# Patient Record
Sex: MALE | Race: WHITE | NOT HISPANIC OR LATINO | Employment: OTHER | URBAN - METROPOLITAN AREA
[De-identification: names, ages, dates, MRNs, and addresses within clinical notes are randomized per-mention and may not be internally consistent; named-entity substitution may affect disease eponyms.]

---

## 2023-10-25 DIAGNOSIS — R35.1 NOCTURIA: Primary | ICD-10-CM

## 2023-11-21 PROBLEM — I77.819 AORTIC ECTASIA, UNSPECIFIED SITE (CMS-HCC): Status: ACTIVE | Noted: 2022-09-22

## 2023-11-21 PROBLEM — R29.898 SEVERE MUSCLE DECONDITIONING: Status: ACTIVE | Noted: 2021-07-24

## 2023-11-21 PROBLEM — I83.819 VARICOSE VEINS WITH PAIN: Status: ACTIVE | Noted: 2017-08-09

## 2023-11-21 PROBLEM — R35.1 NOCTURIA: Status: ACTIVE | Noted: 2023-11-21

## 2023-11-21 PROBLEM — R97.20 ELEVATED PSA: Status: ACTIVE | Noted: 2023-11-21

## 2023-11-21 PROBLEM — M48.062 SPINAL STENOSIS OF LUMBAR REGION WITH NEUROGENIC CLAUDICATION: Status: ACTIVE | Noted: 2022-07-28

## 2023-11-21 PROBLEM — S76.119A RUPTURE OF QUADRICEPS TENDON: Status: ACTIVE | Noted: 2021-07-20

## 2023-11-21 PROBLEM — M75.42 IMPINGEMENT SYNDROME OF LEFT SHOULDER: Status: ACTIVE | Noted: 2019-12-16

## 2023-11-21 PROBLEM — I83.899 VARICOSE VEINS OF LEG WITH SWELLING: Status: ACTIVE | Noted: 2017-08-09

## 2023-11-21 PROBLEM — M25.512 ACUTE PAIN OF LEFT SHOULDER: Status: ACTIVE | Noted: 2019-11-04

## 2023-11-21 PROBLEM — I70.213 ATHEROSCLER OF NATIVE ARTERY OF BOTH LEGS WITH INTERMIT CLAUDICATION (CMS-HCC): Status: ACTIVE | Noted: 2022-09-22

## 2023-11-21 PROBLEM — M17.0 PRIMARY OSTEOARTHRITIS OF BOTH KNEES: Status: ACTIVE | Noted: 2022-03-23

## 2023-11-21 PROBLEM — R29.898 WEAKNESS OF BOTH LOWER LIMBS: Status: ACTIVE | Noted: 2023-11-02

## 2023-11-21 PROBLEM — S76.119D: Status: ACTIVE | Noted: 2021-09-03

## 2023-11-21 PROBLEM — L24.7 CONTACT DERMATITIS AND ECZEMA DUE TO PLANT: Status: ACTIVE | Noted: 2019-07-30

## 2023-11-21 PROBLEM — C61 PROSTATE CANCER (MULTI): Status: ACTIVE | Noted: 2023-11-21

## 2023-11-21 ASSESSMENT — ENCOUNTER SYMPTOMS
CHILLS: 0
ENDOCRINE NEGATIVE: 1
NAUSEA: 0
ALLERGIC/IMMUNOLOGIC NEGATIVE: 1
FEVER: 0
COUGH: 0
PSYCHIATRIC NEGATIVE: 1
DIFFICULTY URINATING: 0
EYES NEGATIVE: 1
SHORTNESS OF BREATH: 0

## 2023-11-21 NOTE — PROGRESS NOTES
Subjective   Patient ID: Selvin Guzman is a 78 y.o. male.    HPI   Hx of prostate cancer. Dx 1/2019. Path report showed prostate cancer on left side. [place] 3+4=7 involving multiple cores and 20% of tissue. Most recent PSA was 7.71 on 11/23. Prior PSA was  6.59 on 10/22. Prior PSA was 6.21 on 4/22. . Prior PSA was done 4/21 and was 5.31.... Previous PSA was done 12/2020 and was a 4.93, . Patient has never had an MRI due to Claustrophobia. LUT'S sx are mild and stable....Denies urgency and frequency....Denies dysuria....Denies hematuria...Nocturia x1 ..No medication for LUT'S......ED is chronic..No medication for this...Pt. has failed oral medication.       Review of Systems   Constitutional:  Negative for chills and fever.   HENT: Negative.     Eyes: Negative.    Respiratory:  Negative for cough and shortness of breath.    Cardiovascular:  Negative for chest pain and leg swelling.   Gastrointestinal:  Negative for nausea.   Endocrine: Negative.    Genitourinary:  Negative for difficulty urinating.        Negative except for documented in HPI   Allergic/Immunologic: Negative.    Neurological:         Alert & oriented X 3   Hematological:         Denies blood thinners   Psychiatric/Behavioral: Negative.         Objective   Physical Exam  Vitals and nursing note reviewed.   Constitutional:       General: He is not in acute distress.     Appearance: Normal appearance.   Pulmonary:      Effort: Pulmonary effort is normal.   Abdominal:      Tenderness: There is no abdominal tenderness.   Genitourinary:     Comments: Kidneys non palpable bilaterally  Bladder non palpable or tender  Scrotum no mass, No hydrocele  Epididymis- No spermatocele. Non Tender.  Testicles: No mass  Urethra: No discharge  Penis within normal limits... No lesions. circumcised  Prostate - symmetric, no nodules. BENIGN  Seminal Vesicals: No mass.  Sphincter tone: normal  Neurological:      Mental Status: He is alert.         Assessment/Plan    Diagnoses and all orders for this visit:  Elevated PSA  Prostate cancer (CMS/HCC)  Nocturia  Erectile dysfunction, unspecified erectile dysfunction type      All available PSA values reviewed, Options discussed. Questions answered.  Past Bx reviewed  Pros and cons of prostate biopsy reviewed. Other options discussed. Questions answered  Cipro Rx given  Patient prefers repeat Bx and Declines MRI   Diet changes for prostate health discussed and educational information given. Pros/Cons of prostate health supplements discussed.   Treatment options for LUTS reviewed  Discussed timed voiding. Discussed fluid and caffeine intake  Treatment options for ED reviewed.  Discussed TriMix-Rx given  Lifestyle change to help prevent UTIs discussed. Encouraged fluid intake.    F/U with TriMix teaching    Patient want repeat Prostate Bx after returning from [place]

## 2023-11-27 ENCOUNTER — OFFICE VISIT (OUTPATIENT)
Dept: UROLOGY | Facility: CLINIC | Age: 78
End: 2023-11-27
Payer: MEDICARE

## 2023-11-27 VITALS — WEIGHT: 203 LBS | BODY MASS INDEX: 28.31 KG/M2 | RESPIRATION RATE: 16 BRPM

## 2023-11-27 DIAGNOSIS — C61 PROSTATE CANCER (MULTI): ICD-10-CM

## 2023-11-27 DIAGNOSIS — N52.9 ERECTILE DYSFUNCTION, UNSPECIFIED ERECTILE DYSFUNCTION TYPE: ICD-10-CM

## 2023-11-27 DIAGNOSIS — R97.20 ELEVATED PSA: Primary | ICD-10-CM

## 2023-11-27 DIAGNOSIS — R35.1 NOCTURIA: ICD-10-CM

## 2023-11-27 PROCEDURE — 1159F MED LIST DOCD IN RCRD: CPT | Performed by: UROLOGY

## 2023-11-27 PROCEDURE — 99214 OFFICE O/P EST MOD 30 MIN: CPT | Performed by: UROLOGY

## 2023-11-27 RX ORDER — LIDOCAINE 50 MG/G
1 PATCH TOPICAL EVERY 24 HOURS
COMMUNITY
Start: 2023-08-15

## 2023-11-27 RX ORDER — TRIAMTERENE AND HYDROCHLOROTHIAZIDE 37.5; 25 MG/1; MG/1
CAPSULE ORAL
COMMUNITY
Start: 2013-08-01

## 2023-11-27 RX ORDER — MAGNESIUM 200 MG
200 TABLET ORAL
COMMUNITY

## 2023-11-27 RX ORDER — CIPROFLOXACIN 500 MG/1
500 TABLET ORAL 2 TIMES DAILY
Qty: 6 TABLET | Refills: 0 | Status: SHIPPED | OUTPATIENT
Start: 2023-11-27 | End: 2023-11-30

## 2023-11-27 RX ORDER — SIMVASTATIN 40 MG/1
1 TABLET, FILM COATED ORAL NIGHTLY
COMMUNITY
Start: 2023-08-25

## 2023-11-27 RX ORDER — CELECOXIB 200 MG/1
200 CAPSULE ORAL 2 TIMES DAILY
COMMUNITY

## 2023-11-27 RX ORDER — ASPIRIN 81 MG/1
81 TABLET ORAL
COMMUNITY
Start: 2022-09-22

## 2023-11-30 DIAGNOSIS — N52.9 ERECTILE DYSFUNCTION, UNSPECIFIED ERECTILE DYSFUNCTION TYPE: ICD-10-CM

## 2023-12-01 RX ORDER — SILDENAFIL 100 MG/1
100 TABLET, FILM COATED ORAL DAILY PRN
Qty: 30 TABLET | Refills: 6 | Status: SHIPPED | OUTPATIENT
Start: 2023-12-01 | End: 2023-12-31

## 2023-12-07 ASSESSMENT — ENCOUNTER SYMPTOMS
FEVER: 0
CHILLS: 0
COUGH: 0
ALLERGIC/IMMUNOLOGIC NEGATIVE: 1
DIFFICULTY URINATING: 0
EYES NEGATIVE: 1
PSYCHIATRIC NEGATIVE: 1
ENDOCRINE NEGATIVE: 1
SHORTNESS OF BREATH: 0
NAUSEA: 0

## 2023-12-07 NOTE — PROGRESS NOTES
Subjective   Patient ID: Selvin Guzman is a 78 y.o. male.    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 He has failed oral medications. Hx of prostate cancer. Dx 1/2019. Path report showed prostate cancer on left side. [place] 3+4=7 involving multiple cores and 20% of tissue. Most recent PSA was 7.71 on 11/23. Prior PSA was  6.59 on 10/22. Prior PSA was 6.21 on 4/22. . Prior PSA was done 4/21 and was 5.31.... Previous PSA was done 12/2020 and was a 4.93, . Patient has never had an MRI due to Claustrophobia. LUT'S sx are mild and stable....Denies urgency and frequency....Denies dysuria....Denies hematuria...Nocturia x1 ..No medication for LUT'S...       Review of Systems   Constitutional:  Negative for chills and fever.   HENT: Negative.     Eyes: Negative.    Respiratory:  Negative for cough and shortness of breath.    Cardiovascular:  Negative for chest pain and leg swelling.   Gastrointestinal:  Negative for nausea.   Endocrine: Negative.    Genitourinary:  Negative for difficulty urinating.        Negative except for documented in HPI   Allergic/Immunologic: Negative.    Neurological:         Alert & oriented X 3   Hematological:         Denies blood thinners   Psychiatric/Behavioral: Negative.         Objective   Physical Exam  Vitals and nursing note reviewed.   Constitutional:       General: He is not in acute distress.     Appearance: Normal appearance.   Pulmonary:      Effort: Pulmonary effort is normal.      Breath sounds: Normal breath sounds.   Abdominal:      Palpations: Abdomen is soft.      Tenderness: There is no abdominal tenderness.   Genitourinary:     Comments: Kidneys non palpable bilaterally  Bladder non palpable or tender  Scrotum no mass, No hydrocele  Epididymis- No spermatocele. Non Tender.  Testicles: No mass  Urethra: No discharge. circumcised  Penis within normal limits... No lesions  Prostate - deferred  Neurological:      Mental Status: He is alert.         Assessment/Plan        Diagnoses and all orders for this visit:  Prostate cancer (CMS/[facility])  Nocturia  Erectile dysfunction, unspecified erectile dysfunction type  Elevated PSA    All available PSA values reviewed, Options discussed. Questions answered.   Diet changes for prostate health discussed and educational information given. Pros/Cons of prostate health supplements discussed.   Treatment options for LUTS reviewed  Discussed timed voiding. Discussed fluid and caffeine intake  Treatment options for ED reviewed.  Lifestyle change to help prevent UTIs discussed. Encouraged fluid intake.    AFTER Visit I taught patient Trimix  0.1ml given    F/U virtual 6 weeks

## 2023-12-11 ENCOUNTER — OFFICE VISIT (OUTPATIENT)
Dept: UROLOGY | Facility: CLINIC | Age: 78
End: 2023-12-11
Payer: MEDICARE

## 2023-12-11 DIAGNOSIS — R35.1 NOCTURIA: ICD-10-CM

## 2023-12-11 DIAGNOSIS — N52.9 ERECTILE DYSFUNCTION, UNSPECIFIED ERECTILE DYSFUNCTION TYPE: ICD-10-CM

## 2023-12-11 DIAGNOSIS — R97.20 ELEVATED PSA: ICD-10-CM

## 2023-12-11 DIAGNOSIS — C61 PROSTATE CANCER (MULTI): ICD-10-CM

## 2023-12-11 PROCEDURE — 1159F MED LIST DOCD IN RCRD: CPT | Performed by: UROLOGY

## 2023-12-11 PROCEDURE — 99213 OFFICE O/P EST LOW 20 MIN: CPT | Performed by: UROLOGY

## 2023-12-11 PROCEDURE — 4004F PT TOBACCO SCREEN RCVD TLK: CPT | Performed by: UROLOGY

## 2024-01-26 ASSESSMENT — ENCOUNTER SYMPTOMS
ALLERGIC/IMMUNOLOGIC NEGATIVE: 1
COUGH: 0
FEVER: 0
PSYCHIATRIC NEGATIVE: 1
SHORTNESS OF BREATH: 0
ENDOCRINE NEGATIVE: 1
NAUSEA: 0
EYES NEGATIVE: 1
DIFFICULTY URINATING: 0
CHILLS: 0

## 2024-01-26 NOTE — PROGRESS NOTES
Subjective   Patient ID: Selvin Guzman is a 78 y.o. male.    HPI  Patient is here for 6 week follow up. Hx of ED. He was taught Trimix last visit and states it was helpful. He has failed oral medications. Hx of prostate cancer. Dx 1/2019. Path report showed prostate cancer on left side. [place] 3+4=7 involving multiple cores and 20% of tissue. Most recent PSA was 7.71 on 11/23. Prior PSA was  6.59 on 10/22. Prior PSA was 6.21 on 4/22. . Prior PSA was done 4/21 and was 5.31.... Previous PSA was done 12/2020 and was a 4.93, . Patient has never had an MRI due to Claustrophobia. LUT'S sx are mild and stable....Denies urgency and frequency....Denies dysuria....Denies hematuria...Nocturia x1 ..No medication for LUT'S...        Review of Systems   Constitutional:  Negative for chills and fever.   HENT: Negative.     Eyes: Negative.    Respiratory:  Negative for cough and shortness of breath.    Cardiovascular:  Negative for chest pain and leg swelling.   Gastrointestinal:  Negative for nausea.   Endocrine: Negative.    Genitourinary:  Negative for difficulty urinating.        Negative except for documented in HPI   Allergic/Immunologic: Negative.    Neurological:         Alert & oriented X 3   Hematological:         Denies blood thinners   Psychiatric/Behavioral: Negative.         Objective   Physical Exam  No PE done given the virtual nature of visit.   Assessment/Plan   Diagnoses and all orders for this visit:  Prostate cancer (CMS/[facility])  Nocturia  Erectile dysfunction, unspecified erectile dysfunction type  Elevated PSA    All available PSA values reviewed, Options discussed. Questions answered.   Diet changes for prostate health discussed and educational information given. Pros/Cons of prostate health supplements discussed.   Treatment options for LUTS reviewed  Discussed timed voiding. Discussed fluid and caffeine intake  Treatment options for ED reviewed.  Continue TriMix  Lifestyle change to help prevent UTIs  discussed. Encouraged fluid intake.    F/U with PSA 5/24

## 2024-01-29 ENCOUNTER — TELEMEDICINE (OUTPATIENT)
Dept: UROLOGY | Facility: CLINIC | Age: 79
End: 2024-01-29
Payer: MEDICARE

## 2024-01-29 DIAGNOSIS — R97.20 ELEVATED PSA: ICD-10-CM

## 2024-01-29 DIAGNOSIS — R35.1 NOCTURIA: ICD-10-CM

## 2024-01-29 DIAGNOSIS — N52.9 ERECTILE DYSFUNCTION, UNSPECIFIED ERECTILE DYSFUNCTION TYPE: ICD-10-CM

## 2024-01-29 DIAGNOSIS — C61 PROSTATE CANCER (MULTI): ICD-10-CM

## 2024-01-29 PROCEDURE — 99213 OFFICE O/P EST LOW 20 MIN: CPT | Performed by: UROLOGY

## 2024-05-15 ASSESSMENT — ENCOUNTER SYMPTOMS
ENDOCRINE NEGATIVE: 1
NAUSEA: 0
CHILLS: 0
PSYCHIATRIC NEGATIVE: 1
DIFFICULTY URINATING: 0
ALLERGIC/IMMUNOLOGIC NEGATIVE: 1
SHORTNESS OF BREATH: 0
COUGH: 0
FEVER: 0
EYES NEGATIVE: 1

## 2024-05-15 NOTE — PROGRESS NOTES
Subjective   Patient ID: Selvin Guzman is a 78 y.o. male.    HPI  Hx of prostate cancer. Dx 1/2019. Path report showed prostate cancer on left side. [place] 3+4=7 involving multiple cores and 20% of tissue. Most recent PSA was 8.98 on 5/24. Prior PSA wa s 7.71 on 11/23. Prior PSA was  6.59 on 10/22. Prior PSA was 6.21 on 4/22. . Prior PSA was done 4/21 and was 5.31.... Previous PSA was done 12/2020 and was a 4.93, . Patient has never had an MRI due to Claustrophobia. LUT'S sx are mild and stable....Denies urgency and frequency....Denies dysuria....Denies hematuria...Nocturia x1 ..No medication for LUT'S. ED is chronic. Trimix PRN.       Review of Systems   Constitutional:  Negative for chills and fever.   HENT: Negative.     Eyes: Negative.    Respiratory:  Negative for cough and shortness of breath.    Cardiovascular:  Negative for chest pain and leg swelling.   Gastrointestinal:  Negative for nausea.   Endocrine: Negative.    Genitourinary:  Negative for difficulty urinating.        Negative except for documented in HPI   Allergic/Immunologic: Negative.    Neurological:         Alert & oriented X 3   Hematological:         Denies blood thinners   Psychiatric/Behavioral: Negative.         Objective   Physical Exam    Assessment/Plan   There are no diagnoses linked to this encounter.

## 2024-05-16 ENCOUNTER — APPOINTMENT (OUTPATIENT)
Dept: UROLOGY | Facility: CLINIC | Age: 79
End: 2024-05-16
Payer: MEDICARE

## 2024-05-16 DIAGNOSIS — R97.20 ELEVATED PSA: ICD-10-CM

## 2024-05-16 DIAGNOSIS — R35.1 NOCTURIA: ICD-10-CM

## 2024-05-16 DIAGNOSIS — N52.9 ERECTILE DYSFUNCTION, UNSPECIFIED ERECTILE DYSFUNCTION TYPE: ICD-10-CM

## 2024-05-16 DIAGNOSIS — C61 PROSTATE CANCER (MULTI): ICD-10-CM

## 2024-05-17 ASSESSMENT — ENCOUNTER SYMPTOMS
SHORTNESS OF BREATH: 0
DIFFICULTY URINATING: 0
NAUSEA: 0
COUGH: 0
CHILLS: 0
EYES NEGATIVE: 1
FEVER: 0
ALLERGIC/IMMUNOLOGIC NEGATIVE: 1
PSYCHIATRIC NEGATIVE: 1
ENDOCRINE NEGATIVE: 1

## 2024-05-17 NOTE — PROGRESS NOTES
Subjective   Patient ID: Selvin Guzman is a 78 y.o. male.    HPI  Hx of prostate cancer. Dx 1/2019. Path report showed prostate cancer on left side. [place] 3+4=7 involving multiple cores and 20% of tissue.  He is doing ACTIVE SURVEILLANCE. Patient is too claustrophobic for MRI. PET scan is scheduled for Hx of lung nodule.  Most recent PSA was 8.98 on 5/24. Prior PSA was 7.71 on 11/23. Prior PSA was  6.59 on 10/22. Prior PSA was 6.21 on 4/22. . Prior PSA was done 4/21 and was 5.31.... Previous PSA was done 12/2020 and was a 4.93..... LUT'S sx are mild and stable....Denies urgency and frequency....Denies dysuria....Denies hematuria...Nocturia x1 ..No medication for LUT'S.. ED is chronic-He has tried Viagra      Review of Systems   Constitutional:  Negative for chills and fever.   HENT: Negative.     Eyes: Negative.    Respiratory:  Negative for cough and shortness of breath.    Cardiovascular:  Negative for chest pain and leg swelling.   Gastrointestinal:  Negative for nausea.   Endocrine: Negative.    Genitourinary:  Negative for difficulty urinating.        Negative except for documented in HPI   Allergic/Immunologic: Negative.    Neurological:         Alert & oriented X 3   Hematological:         Denies blood thinners   Psychiatric/Behavioral: Negative.         Objective   Physical Exam  Vitals and nursing note reviewed.   Constitutional:       General: He is not in acute distress.     Appearance: Normal appearance.   Pulmonary:      Effort: Pulmonary effort is normal.   Abdominal:      Tenderness: There is no abdominal tenderness.   Genitourinary:     Comments: Kidneys non palpable bilaterally  Bladder non palpable or tender  Scrotum no mass, No hydrocele  Epididymis- No spermatocele. Non Tender.  Testicles: No mass. WNL  Urethra: No discharge  Penis within normal limits... No lesions.   Prostate - symmetric, no nodules. BENIGN  Seminal Vesicals: No mass.  Sphincter tone: normal  Neurological:      Mental  Status: He is alert.         Assessment/Plan   Diagnoses and all orders for this visit:  Prostate cancer (Multi)  Nocturia  Erectile dysfunction, unspecified erectile dysfunction type  Elevated PSA      All available PSA values reviewed, Options discussed. Questions answered.  Patient declines MRI  PET scan scheduled already-Valium Rx given for PET SCAN   Diet changes for prostate health discussed and educational information given. Pros/Cons of prostate health supplements discussed.   Treatment options for LUTS reviewed  Discussed timed voiding. Discussed fluid and caffeine intake  Treatment options for ED reviewed.  Continue Viagra  Lifestyle change to help prevent UTIs discussed. Encouraged fluid intake.    F/U  after PET scan

## 2024-05-20 ENCOUNTER — OFFICE VISIT (OUTPATIENT)
Dept: UROLOGY | Facility: CLINIC | Age: 79
End: 2024-05-20
Payer: MEDICARE

## 2024-05-20 VITALS — BODY MASS INDEX: 28.73 KG/M2 | WEIGHT: 206 LBS | RESPIRATION RATE: 16 BRPM

## 2024-05-20 DIAGNOSIS — N52.9 ERECTILE DYSFUNCTION, UNSPECIFIED ERECTILE DYSFUNCTION TYPE: ICD-10-CM

## 2024-05-20 DIAGNOSIS — R97.20 ELEVATED PSA: ICD-10-CM

## 2024-05-20 DIAGNOSIS — R35.1 NOCTURIA: ICD-10-CM

## 2024-05-20 DIAGNOSIS — C61 PROSTATE CANCER (MULTI): ICD-10-CM

## 2024-05-20 PROCEDURE — 99214 OFFICE O/P EST MOD 30 MIN: CPT | Performed by: UROLOGY

## 2024-05-20 PROCEDURE — 1159F MED LIST DOCD IN RCRD: CPT | Performed by: UROLOGY

## 2024-05-21 RX ORDER — DIAZEPAM 10 MG/1
10 TABLET ORAL ONCE
Qty: 1 TABLET | Refills: 0 | Status: SHIPPED | OUTPATIENT
Start: 2024-05-21 | End: 2024-05-21

## 2024-06-28 ASSESSMENT — ENCOUNTER SYMPTOMS
PSYCHIATRIC NEGATIVE: 1
EYES NEGATIVE: 1
CHILLS: 0
FEVER: 0
NAUSEA: 0
ENDOCRINE NEGATIVE: 1
COUGH: 0
ALLERGIC/IMMUNOLOGIC NEGATIVE: 1
DIFFICULTY URINATING: 0
SHORTNESS OF BREATH: 0

## 2024-06-28 NOTE — PROGRESS NOTES
Subjective   Patient ID: Selvin Guzman is a 79 y.o. male.    HPI  Patient is here for PET scan results done for Lung Nodule. This showed no  findings. . Hx of prostate cancer. Dx 1/2019. Path report showed prostate cancer on left side. [place] 3+4=7 involving multiple cores and 20% of tissue.  He is doing ACTIVE SURVEILLANCE. Patient is too claustrophobic for MRI.  Most recent PSA was 8.98 on 5/24. Prior PSA was 7.71 on 11/23. Prior PSA was  6.59 on 10/22. Prior PSA was 6.21 on 4/22. . Prior PSA was done 4/21 and was 5.31.... Previous PSA was done 12/2020 and was a 4.93..... LUT'S sx are mild and stable....Denies urgency and frequency....Denies dysuria....Denies hematuria...Nocturia x1 ..No medication for LUT'S.. ED is chronic-He has tried Viagra         Review of Systems   Constitutional:  Negative for chills and fever.   HENT: Negative.     Eyes: Negative.    Respiratory:  Negative for cough and shortness of breath.    Cardiovascular:  Negative for chest pain and leg swelling.   Gastrointestinal:  Negative for nausea.   Endocrine: Negative.    Genitourinary:  Negative for difficulty urinating.        Negative except for documented in HPI   Allergic/Immunologic: Negative.    Neurological:         Alert & oriented X 3   Hematological:         Denies blood thinners   Psychiatric/Behavioral: Negative.         Objective   Physical Exam  Vitals and nursing note reviewed.   Pulmonary:      Effort: Pulmonary effort is normal.   Abdominal:      Palpations: Abdomen is soft.      Tenderness: There is no abdominal tenderness.   Genitourinary:     Comments: Kidneys non palpable bilaterally  Bladder non palpable or tender  Neurological:      Mental Status: He is alert.         Assessment/Plan   Diagnoses and all orders for this visit:  Prostate cancer (Multi)  Nocturia  Erectile dysfunction, unspecified erectile dysfunction type  Elevated PSA      All available PSA values reviewed, Options discussed. Questions answered.  MRI  is not an options  PET scan reviewed  Pros/cons Active Surveillance discussed-Will continue   Diet changes for prostate health discussed and educational information given. Pros/Cons of prostate health supplements discussed.   Treatment options for LUTS reviewed  Discussed timed voiding. Discussed fluid and caffeine intake  Treatment options for ED reviewed.  Has Sildenafil-Does not take now    F/U  11/24 with PSA

## 2024-07-08 ENCOUNTER — APPOINTMENT (OUTPATIENT)
Dept: UROLOGY | Facility: CLINIC | Age: 79
End: 2024-07-08
Payer: MEDICARE

## 2024-07-08 VITALS — BODY MASS INDEX: 29.29 KG/M2 | WEIGHT: 210 LBS | RESPIRATION RATE: 16 BRPM

## 2024-07-08 DIAGNOSIS — R35.1 NOCTURIA: ICD-10-CM

## 2024-07-08 DIAGNOSIS — C61 PROSTATE CANCER (MULTI): ICD-10-CM

## 2024-07-08 DIAGNOSIS — R97.20 ELEVATED PSA: ICD-10-CM

## 2024-07-08 DIAGNOSIS — N52.9 ERECTILE DYSFUNCTION, UNSPECIFIED ERECTILE DYSFUNCTION TYPE: ICD-10-CM

## 2024-07-08 PROCEDURE — 99213 OFFICE O/P EST LOW 20 MIN: CPT | Performed by: UROLOGY

## 2024-07-08 PROCEDURE — 1159F MED LIST DOCD IN RCRD: CPT | Performed by: UROLOGY

## 2024-11-06 ASSESSMENT — ENCOUNTER SYMPTOMS
EYES NEGATIVE: 1
ENDOCRINE NEGATIVE: 1
NAUSEA: 0
SHORTNESS OF BREATH: 0
PSYCHIATRIC NEGATIVE: 1
CHILLS: 0
DIFFICULTY URINATING: 0
ALLERGIC/IMMUNOLOGIC NEGATIVE: 1
FEVER: 0
COUGH: 0

## 2024-11-06 NOTE — PROGRESS NOTES
Subjective   Patient ID: Selvin Guzman is a 79 y.o. male.    HPI  Hx of prostate cancer. Dx 1/2019. Path report showed prostate cancer on left side. [place] 3+4=7 involving multiple cores and 20% of tissue.  He is doing ACTIVE SURVEILLANCE. Patient is too claustrophobic for MRI. He did have a PET SCAN 6/24 which was unremarkable.  Most recent PSA was 5.54 on 11/24. Prior PSA was  8.98 on 5/24. Prior PSA was 7.71 on 11/23. Prior PSA was  6.59 on 10/22. Prior PSA was 6.21 on 4/22. . Prior PSA was done 4/21 and was 5.31.... Previous PSA was done 12/2020 and was a 4.93..... LUT'S sx are mild and stable....Denies urgency and frequency....Denies dysuria....Denies hematuria...Nocturia x1 ..No medication for LUT'S.. ED is chronic.       Review of Systems   Constitutional:  Negative for chills and fever.   HENT: Negative.     Eyes: Negative.    Respiratory:  Negative for cough and shortness of breath.    Cardiovascular:  Negative for chest pain and leg swelling.   Gastrointestinal:  Negative for nausea.   Endocrine: Negative.    Genitourinary:  Negative for difficulty urinating.        Negative except for documented in HPI   Allergic/Immunologic: Negative.    Neurological:         Alert & oriented X 3   Hematological:         Denies blood thinners   Psychiatric/Behavioral: Negative.         Objective   Physical Exam  Vitals and nursing note reviewed.   Constitutional:       General: He is not in acute distress.     Appearance: Normal appearance.   Pulmonary:      Effort: Pulmonary effort is normal.   Abdominal:      Tenderness: There is no abdominal tenderness.   Genitourinary:     Comments: Kidneys non palpable bilaterally  Bladder non palpable or tender  Scrotum no mass, No hydrocele  Epididymis- No spermatocele. Non Tender.  Testicles: No mass  Urethra: No discharge. Mild stenosis with lichen sclerosis  Penis within normal limits... No lesions  Prostate - symmetric, no nodules. BENIGN  Seminal Vesicals: No  "mass.  Sphincter tone: normal  Neurological:      Mental Status: He is alert.         Assessment/Plan       Diagnoses and all orders for this visit:  Prostate cancer (Multi)  Nocturia  Erectile dysfunction, unspecified erectile dysfunction type  Elevated PSA    All available PSA values reviewed, Options discussed. Questions answered.  PET scan reviewed  Risks of Active Surveillance discussed  Pros/cons of repeat Bx reviewed-patient would only consider  if PSA rises \"above 9 or 10\"   Diet changes for prostate health discussed and educational information given. Pros/Cons of prostate health supplements discussed.   Treatment options for LUTS reviewed  Discussed timed voiding. Discussed fluid and caffeine intake  Treatment options for ED reviewed.  Lifestyle change to help prevent UTIs discussed. Encouraged fluid intake.    F/U 6 months with PSA      "

## 2024-11-11 ENCOUNTER — APPOINTMENT (OUTPATIENT)
Dept: UROLOGY | Facility: CLINIC | Age: 79
End: 2024-11-11
Payer: MEDICARE

## 2024-11-11 VITALS
HEART RATE: 53 BPM | BODY MASS INDEX: 30.13 KG/M2 | WEIGHT: 216 LBS | SYSTOLIC BLOOD PRESSURE: 165 MMHG | DIASTOLIC BLOOD PRESSURE: 64 MMHG

## 2024-11-11 DIAGNOSIS — N52.9 ERECTILE DYSFUNCTION, UNSPECIFIED ERECTILE DYSFUNCTION TYPE: ICD-10-CM

## 2024-11-11 DIAGNOSIS — R97.20 ELEVATED PSA: ICD-10-CM

## 2024-11-11 DIAGNOSIS — R35.1 NOCTURIA: ICD-10-CM

## 2024-11-11 DIAGNOSIS — C61 PROSTATE CANCER (MULTI): ICD-10-CM

## 2024-11-11 PROCEDURE — 1159F MED LIST DOCD IN RCRD: CPT | Performed by: UROLOGY

## 2024-11-11 PROCEDURE — 3077F SYST BP >= 140 MM HG: CPT | Performed by: UROLOGY

## 2024-11-11 PROCEDURE — 99214 OFFICE O/P EST MOD 30 MIN: CPT | Performed by: UROLOGY

## 2024-11-11 PROCEDURE — 3078F DIAST BP <80 MM HG: CPT | Performed by: UROLOGY

## 2024-11-11 RX ORDER — ERYTHROMYCIN 5 MG/G
OINTMENT OPHTHALMIC
COMMUNITY
Start: 2024-08-18

## 2025-04-23 ENCOUNTER — TELEPHONE (OUTPATIENT)
Dept: UROLOGY | Facility: CLINIC | Age: 80
End: 2025-04-23
Payer: MEDICARE

## 2025-04-23 NOTE — TELEPHONE ENCOUNTER
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

## 2025-05-06 LAB — PSA SERPL-MCNC: 10.43 NG/ML

## 2025-05-12 ENCOUNTER — APPOINTMENT (OUTPATIENT)
Dept: UROLOGY | Facility: CLINIC | Age: 80
End: 2025-05-12
Payer: MEDICARE

## 2025-05-20 ASSESSMENT — ENCOUNTER SYMPTOMS
NAUSEA: 0
PSYCHIATRIC NEGATIVE: 1
ALLERGIC/IMMUNOLOGIC NEGATIVE: 1
EYES NEGATIVE: 1
ENDOCRINE NEGATIVE: 1
FEVER: 0
DIFFICULTY URINATING: 0
SHORTNESS OF BREATH: 0
CHILLS: 0
COUGH: 0

## 2025-05-21 ENCOUNTER — APPOINTMENT (OUTPATIENT)
Dept: UROLOGY | Facility: CLINIC | Age: 80
End: 2025-05-21
Payer: MEDICARE

## 2025-05-21 DIAGNOSIS — C61 PROSTATE CANCER (MULTI): ICD-10-CM

## 2025-05-21 DIAGNOSIS — N52.9 ERECTILE DYSFUNCTION, UNSPECIFIED ERECTILE DYSFUNCTION TYPE: ICD-10-CM

## 2025-05-21 DIAGNOSIS — R35.1 NOCTURIA: ICD-10-CM

## 2025-05-21 DIAGNOSIS — R97.20 ELEVATED PSA: ICD-10-CM

## 2025-05-21 PROCEDURE — 99214 OFFICE O/P EST MOD 30 MIN: CPT | Performed by: UROLOGY

## 2025-05-21 PROCEDURE — G2211 COMPLEX E/M VISIT ADD ON: HCPCS | Performed by: UROLOGY

## 2025-05-21 PROCEDURE — 1159F MED LIST DOCD IN RCRD: CPT | Performed by: UROLOGY

## 2025-05-21 RX ORDER — FUROSEMIDE 40 MG/1
40 TABLET ORAL
COMMUNITY
Start: 2025-03-05

## 2025-05-21 RX ORDER — ALBUTEROL SULFATE 90 UG/1
INHALANT RESPIRATORY (INHALATION)
COMMUNITY
Start: 2025-03-05

## 2025-06-17 DIAGNOSIS — N52.9 ERECTILE DYSFUNCTION, UNSPECIFIED ERECTILE DYSFUNCTION TYPE: ICD-10-CM

## 2025-06-18 RX ORDER — SILDENAFIL 100 MG/1
100 TABLET, FILM COATED ORAL DAILY PRN
Qty: 30 TABLET | Refills: 6 | Status: SHIPPED | OUTPATIENT
Start: 2025-06-18 | End: 2025-07-18

## 2025-08-21 DIAGNOSIS — C61 PROSTATE CANCER (MULTI): ICD-10-CM

## 2025-08-23 LAB — PSA SERPL-MCNC: 7.97 NG/ML
